# Patient Record
Sex: MALE | Employment: OTHER | URBAN - NONMETROPOLITAN AREA
[De-identification: names, ages, dates, MRNs, and addresses within clinical notes are randomized per-mention and may not be internally consistent; named-entity substitution may affect disease eponyms.]

---

## 2021-05-18 ENCOUNTER — LAB ENCOUNTER (OUTPATIENT)
Dept: LAB | Age: 61
End: 2021-05-18
Attending: FAMILY MEDICINE
Payer: COMMERCIAL

## 2021-05-18 DIAGNOSIS — I10 ESSENTIAL HYPERTENSION: ICD-10-CM

## 2021-05-18 DIAGNOSIS — K70.31 ASCITES DUE TO ALCOHOLIC CIRRHOSIS (HCC): ICD-10-CM

## 2021-05-18 DIAGNOSIS — R73.01 IFG (IMPAIRED FASTING GLUCOSE): ICD-10-CM
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
## 2021-05-19 ENCOUNTER — TELEPHONE (OUTPATIENT)
Dept: FAMILY MEDICINE CLINIC | Facility: CLINIC | Age: 61
End: 2021-05-19

## 2021-05-19 NOTE — TELEPHONE ENCOUNTER
Received records from [address] [place] [address] in [address] [place] [address]. Entered some labs into epic.

## 2021-05-21 ENCOUNTER — MED REC SCAN ONLY (OUTPATIENT)
Dept: FAMILY MEDICINE CLINIC | Facility: CLINIC | Age: 61
End: 2021-05-21

## 2021-05-21 ENCOUNTER — TELEPHONE (OUTPATIENT)
Dept: FAMILY MEDICINE CLINIC | Facility: CLINIC | Age: 61
End: 2021-05-21

## 2021-05-21 RX ORDER — LACTULOSE 10 G/15ML
30 SOLUTION ORAL 3 TIMES DAILY PRN
Qty: 4050 ML | Refills: 1 | Status: SHIPPED | OUTPATIENT
Start: 2021-05-21 | End: 2021-07-15

## 2021-05-21 RX ORDER — LACTULOSE 10 G/15ML
30 SOLUTION ORAL 3 TIMES DAILY PRN
Qty: 236 ML | Refills: 0 | Status: SHIPPED | OUTPATIENT
Start: 2021-05-21 | End: 2021-08-20

## 2021-05-21 NOTE — TELEPHONE ENCOUNTER
ON lactulose PRESCRIPTION IT SAYS HIS PLAN WILL ONLY COVER 90 DAY SUPPLY OR MORE, PER PT CANCEL THIS @ [address] TO Florenc[facility] Shabazz

## 2021-05-21 NOTE — TELEPHONE ENCOUNTER
Spoke to patient to confirm how taking Lactulose; he presently is taking once daily; but can take it up to 3 times a day if needed. Problem is he is currently out; will send smaller qty to retail pharmacy; and larger qty to [employer].

## 2021-05-27 ENCOUNTER — TELEPHONE (OUTPATIENT)
Dept: FAMILY MEDICINE CLINIC | Facility: CLINIC | Age: 61
End: 2021-05-27

## 2021-06-02 ENCOUNTER — TELEPHONE (OUTPATIENT)
Dept: FAMILY MEDICINE CLINIC | Facility: CLINIC | Age: 61
End: 2021-06-02

## 2021-06-02 NOTE — TELEPHONE ENCOUNTER
Pop Caldera, RN calling back requesting LOV note, labs, med list     Murlizy Lipa - [phone]  Fax - [phone]    Pt has an upcoming appt on 6/14, but needs to be seen ASAP d/t ascites rapidly re-accumulating.      Pop Caldera will be calling back to let us know

## 2021-06-02 NOTE — PROGRESS NOTES
Mitchell Horta is a 62year old male. Patient presents with:  HTN: fup on labs done at [facility], BP check. ...room 2      HPI:   Patient was hospitalized at [place] [address] early in May.   He was diagnosed with alcoholic cirrhotic liver disease 100 MG Oral Tab, Take 100 mg by mouth daily. , Disp: , Rfl:   mometasone (NASONEX) 50 MCG/ACT Nasal Suspension, 2 Sprays by Nasal route daily. , Disp: 17 g, Rfl: 3    No current facility-administered medications on file prior to visit.        Past Medical His distress  SKIN: no rashes,no suspicious lesions  NECK: supple, no cervical adenopathy  LUNGS: clear to auscultation  CARDIO: RRR without murmur  ABD tense ascites, no tenderness  EXTREMITIES: Bilateral 1+ edema to the tibial plateaus    Orders Only on 05/1 therapy, please refer  to the Heparin Anti-Xa assay (test code 07555). For additional information, please refer to  http://education. [employer]. Nifti/faq/FKD822  (This link is being provided for   informational/educational purposes only.)     • INR elevation of K, Phos and LD  as well as a false decrease in Glucose may occur due to    prolonged contact with red cells.                    Fasting reference interval     For someone without known diabetes, a glucose value  between 100 and 125 mg/dL is con for diagnosis of diabetes in children. According to American Diabetes Association (ADA)  guidelines, hemoglobin A1c <7.0% represents optimal  control in non-pregnant diabetic patients. Different  metrics may apply to specific patient populations.    Sta (hcc)  Essential hypertension  Ifg (impaired fasting glucose)  Hyponatremia    I will put a call out to [facility] gastroenterology to see if we can expedite an appointment . Jaron Joyce He is going to need a repeat paracenteis.   Once we hear from [address] we will

## 2021-06-03 NOTE — TELEPHONE ENCOUNTER
Spoke to Abeba, he states his paracentesis is scheduled until 6/16/21. Pt needs to be treated as soon as possible per Dr. Ford Olsen request.     Left detailed msg for Rudy Tipton RN to assist with coordinating procedure sooner.      Dr. Shanel Mota has been

## 2021-06-03 NOTE — TELEPHONE ENCOUNTER
Spoke to MANOJ Jauregui     She states that patient is supposed to call the hospital today and schedule a paracentesis. He will then follow-up with Dr. Suzi Watts on 6/14/21 to follow-up post procedure.      She was unable to give me date of paracentesis and referr

## 2021-06-03 NOTE — TELEPHONE ENCOUNTER
Spoke to Shelli Araya RN confirmed pt has a scheduled paracentesis on 6/8/21 at 12:15pm. He will need to remain on a low sodium diet. She states they are not able to accommodate him sooner. Per nurse, Hortensia Moncada reported that he is feeling better and denied SOB.

## 2021-06-03 NOTE — TELEPHONE ENCOUNTER
I would defer to the gastroenterologist and if they are recommending he remain on the low-sodium diet, that is what he should do

## 2021-07-15 ENCOUNTER — OFFICE VISIT (OUTPATIENT)
Dept: FAMILY MEDICINE CLINIC | Facility: CLINIC | Age: 61
End: 2021-07-15
Payer: COMMERCIAL

## 2021-07-15 ENCOUNTER — TELEPHONE (OUTPATIENT)
Dept: FAMILY MEDICINE CLINIC | Facility: CLINIC | Age: 61
End: 2021-07-15

## 2021-07-15 VITALS
HEART RATE: 80 BPM | RESPIRATION RATE: 16 BRPM | DIASTOLIC BLOOD PRESSURE: 74 MMHG | TEMPERATURE: 98 F | HEIGHT: 73 IN | SYSTOLIC BLOOD PRESSURE: 138 MMHG | BODY MASS INDEX: 40.21 KG/M2 | OXYGEN SATURATION: 99 % | WEIGHT: 303.38 LBS

## 2021-07-15 DIAGNOSIS — K76.6 PORTAL HYPERTENSION (HCC): Primary | ICD-10-CM

## 2021-07-15 LAB — INR: 1.4 (ref 0.8–1.2)

## 2021-07-15 PROCEDURE — 85610 PROTHROMBIN TIME: CPT | Performed by: INTERNAL MEDICINE

## 2021-07-15 PROCEDURE — 3008F BODY MASS INDEX DOCD: CPT | Performed by: INTERNAL MEDICINE

## 2021-07-15 PROCEDURE — 3078F DIAST BP <80 MM HG: CPT | Performed by: INTERNAL MEDICINE

## 2021-07-15 PROCEDURE — 3075F SYST BP GE 130 - 139MM HG: CPT | Performed by: INTERNAL MEDICINE

## 2021-07-15 PROCEDURE — 99213 OFFICE O/P EST LOW 20 MIN: CPT | Performed by: INTERNAL MEDICINE

## 2021-07-15 NOTE — TELEPHONE ENCOUNTER
His wife called and said that he is going to have a procedure done and that he had a physical within the past couple of months. Was that the pre op?

## 2021-07-15 NOTE — TELEPHONE ENCOUNTER
Spoke to Maria Elena at [facility] in [facility]. Ph. [phone]    She has reached out to Ernesto Puente since Monday advising that he needs an H&P. He was offered an appt with their Nurse Practitioner at their facility but pt declined stating the drive was too far.

## 2021-07-15 NOTE — PROGRESS NOTES
Zach Islas is a 62year old male who presents for a pre-operative physical exam. Patient is to have paracentesis at [facility] on 7/16/2021. HPI:   Patient was hospitalized at [place] [address] early in May.   He was diagnosed with alcoholic 4/19/2013      Past Surgical History:   Procedure Laterality Date   • COLONOSCOPY  10/18/12    Seth, flat cecal polyp, recommend repeat 2015   • OTHER SURGICAL HISTORY      Shoulder surgery   • TONSILLECTOMY     • VASECTOMY        Family History   Problem back  EXTREMITIES: no cyanosis, clubbing or edema  NEURO: Oriented times three,cranial nerves are intact,motor and sensory are grossly intact    ASSESSMENT AND PLAN:   Tiffanie Hamm is a 62year old male who presents for a pre-operative physic

## 2021-07-16 ENCOUNTER — TELEPHONE (OUTPATIENT)
Dept: FAMILY MEDICINE CLINIC | Facility: CLINIC | Age: 61
End: 2021-07-16

## 2021-08-09 ENCOUNTER — MED REC SCAN ONLY (OUTPATIENT)
Dept: FAMILY MEDICINE CLINIC | Facility: CLINIC | Age: 61
End: 2021-08-09

## 2021-08-11 ENCOUNTER — OFFICE VISIT (OUTPATIENT)
Dept: FAMILY MEDICINE CLINIC | Facility: CLINIC | Age: 61
End: 2021-08-11
Payer: COMMERCIAL

## 2021-08-11 VITALS
BODY MASS INDEX: 40.63 KG/M2 | WEIGHT: 300 LBS | DIASTOLIC BLOOD PRESSURE: 64 MMHG | SYSTOLIC BLOOD PRESSURE: 130 MMHG | HEART RATE: 83 BPM | HEIGHT: 72 IN | OXYGEN SATURATION: 98 % | TEMPERATURE: 98 F

## 2021-08-11 DIAGNOSIS — D12.6 TUBULAR ADENOMA OF COLON: ICD-10-CM

## 2021-08-11 DIAGNOSIS — R73.01 IFG (IMPAIRED FASTING GLUCOSE): ICD-10-CM

## 2021-08-11 DIAGNOSIS — K76.6 PORTAL HYPERTENSION (HCC): Primary | ICD-10-CM

## 2021-08-11 DIAGNOSIS — Z85.820 HISTORY OF MELANOMA: ICD-10-CM

## 2021-08-11 DIAGNOSIS — G47.33 OSA ON CPAP: ICD-10-CM

## 2021-08-11 DIAGNOSIS — K70.31 ASCITES DUE TO ALCOHOLIC CIRRHOSIS (HCC): ICD-10-CM

## 2021-08-11 DIAGNOSIS — I10 ESSENTIAL HYPERTENSION: ICD-10-CM

## 2021-08-11 DIAGNOSIS — K63.5 DYSPLASTIC POLYP OF COLON: ICD-10-CM

## 2021-08-11 DIAGNOSIS — Z99.89 OSA ON CPAP: ICD-10-CM

## 2021-08-11 PROCEDURE — 3075F SYST BP GE 130 - 139MM HG: CPT | Performed by: FAMILY MEDICINE

## 2021-08-11 PROCEDURE — 3008F BODY MASS INDEX DOCD: CPT | Performed by: FAMILY MEDICINE

## 2021-08-11 PROCEDURE — 3078F DIAST BP <80 MM HG: CPT | Performed by: FAMILY MEDICINE

## 2021-08-11 PROCEDURE — 99214 OFFICE O/P EST MOD 30 MIN: CPT | Performed by: FAMILY MEDICINE

## 2021-08-11 NOTE — PROGRESS NOTES
Lee Roman is a 62year old male. Patient presents with:  Pre-Op Exam: pre op H&P only for paracentesis on 08/13/21 with Dr. Luis A Ruano at [facility], EGK/Colonoscopy on 08/20/21, and another paracentesi on 08/18/21. ... no labs or ekg needed. visit.        Past Medical History:   Diagnosis Date   • Alcoholic cirrhosis of liver with ascites ([address]     Requiring frequent paracentesis at [facility]   • Allergic rhinitis     getting allergy shots   • Asthma    • Dysplastic polyp of colon     high gr apparent distress  SKIN: no rashes,no suspicious lesions  HEENT: atraumatic, normocephalic, R TM normal, L TM normal, Pharynx normal  NECK: supple, no cervical adenopathy  LUNGS: clear to auscultation  CARDIO: RRR without murmur.   EXTREMITIES: Bilateral 2+

## 2021-08-17 ENCOUNTER — TELEPHONE (OUTPATIENT)
Dept: FAMILY MEDICINE CLINIC | Facility: CLINIC | Age: 61
End: 2021-08-17

## 2021-08-19 DIAGNOSIS — R73.01 IFG (IMPAIRED FASTING GLUCOSE): ICD-10-CM

## 2021-08-19 NOTE — TELEPHONE ENCOUNTER
HE HAS A QUESTION ABOUT THE PRESCRIPTIONS HE IS ON.   HE SAID HE LEFT A LIST WITH DR [facility]  ON  WHAT HE TAKES

## 2021-08-20 RX ORDER — AMLODIPINE BESYLATE 10 MG/1
10 TABLET ORAL DAILY
Qty: 90 TABLET | Refills: 1 | Status: SHIPPED | OUTPATIENT
Start: 2021-08-20

## 2021-08-20 RX ORDER — LACTULOSE 10 G/15ML
30 SOLUTION ORAL 3 TIMES DAILY PRN
Qty: 236 ML | Refills: 0 | Status: SHIPPED | OUTPATIENT
Start: 2021-08-20

## 2021-08-20 RX ORDER — AMLODIPINE BESYLATE 10 MG/1
10 TABLET ORAL DAILY
Qty: 14 TABLET | Refills: 0 | Status: SHIPPED | OUTPATIENT
Start: 2021-08-20 | End: 2021-08-20

## 2021-08-20 NOTE — TELEPHONE ENCOUNTER
Hypertension Medications Protocol Twyten4508/20/2021 08:25 AM   CMP or BMP in past 12 months Protocol Details    Last serum creatinine< 2.0     Appointment in past 6 or next 3 months      Last refill - 5/18/21 - #90   Last CMP - 6/1/21 - creatinine - 0.67  L

## 2021-08-23 ENCOUNTER — MED REC SCAN ONLY (OUTPATIENT)
Dept: FAMILY MEDICINE CLINIC | Facility: CLINIC | Age: 61
End: 2021-08-23

## 2021-08-30 ENCOUNTER — TELEPHONE (OUTPATIENT)
Dept: FAMILY MEDICINE CLINIC | Facility: CLINIC | Age: 61
End: 2021-08-30

## 2021-08-30 NOTE — TELEPHONE ENCOUNTER
Okay to refill everything for now but in the future he should be getting the lactulose from the gastroenterology doctor.   The GI will determine how long to keep him on that

## 2021-08-30 NOTE — TELEPHONE ENCOUNTER
LOV 8/11/21    LAST LAB 6/1/21    LAST RX He has refills available on everything    Next OV No future appointments. PROTOCOL  Asking for larger quantity of lactulose, order indicates he should take up to tid, 135 ml.  States he takes it bid at most, 80

## 2021-08-30 NOTE — TELEPHONE ENCOUNTER
REFILL lactulose, Pantoprazole, FOLIC ACID, amLODIPine, furosemide, & spironolactone, 90 DAY SU[facility]PL[facility] ON [facility]

## 2021-09-14 ENCOUNTER — TELEPHONE (OUTPATIENT)
Dept: FAMILY MEDICINE CLINIC | Facility: CLINIC | Age: 61
End: 2021-09-14

## 2021-09-14 NOTE — TELEPHONE ENCOUNTER
Spoke with wife, she would like to  immunization records to send to another physician.  Notified wife that patient would have to come in and sign a medical release form and we could print off vaccination record

## 2021-09-20 ENCOUNTER — MED REC SCAN ONLY (OUTPATIENT)
Dept: FAMILY MEDICINE CLINIC | Facility: CLINIC | Age: 61
End: 2021-09-20

## 2021-09-21 ENCOUNTER — TELEPHONE (OUTPATIENT)
Dept: FAMILY MEDICINE CLINIC | Facility: CLINIC | Age: 61
End: 2021-09-21

## 2021-09-21 NOTE — TELEPHONE ENCOUNTER
PT FILLED OUT MED REC REQ 9/21/2021. SENT TO SCAN STAT 9/22/21. DOS REQ: 2004-PRESENT.   PLEASE SEND RECORDS TO [address] [employer] [address]

## 2021-09-24 ENCOUNTER — MED REC SCAN ONLY (OUTPATIENT)
Dept: FAMILY MEDICINE CLINIC | Facility: CLINIC | Age: 61
End: 2021-09-24

## 2021-10-05 ENCOUNTER — TELEPHONE (OUTPATIENT)
Dept: FAMILY MEDICINE CLINIC | Facility: CLINIC | Age: 61
End: 2021-10-05

## 2021-10-27 ENCOUNTER — TELEPHONE (OUTPATIENT)
Dept: FAMILY MEDICINE CLINIC | Facility: CLINIC | Age: 61
End: 2021-10-27

## 2021-10-27 NOTE — TELEPHONE ENCOUNTER
Julian Gonzales is [facility] is saying that [facility] never received the sleep study paperwork please fax to [phone] Attn: Zainab Padilla PRINCIPAL DISCHARGE DIAGNOSIS  Diagnosis: Cellulitis  Assessment and Plan of Treatment:       SECONDARY DISCHARGE DIAGNOSES  Diagnosis: Sepsis  Assessment and Plan of Treatment:

## 2021-12-29 ENCOUNTER — TELEPHONE (OUTPATIENT)
Dept: FAMILY MEDICINE CLINIC | Facility: CLINIC | Age: 61
End: 2021-12-29

## 2023-02-22 ENCOUNTER — PATIENT OUTREACH (OUTPATIENT)
Dept: FAMILY MEDICINE CLINIC | Facility: CLINIC | Age: 63
End: 2023-02-22

## 2023-07-31 ENCOUNTER — PATIENT OUTREACH (OUTPATIENT)
Dept: FAMILY MEDICINE CLINIC | Facility: CLINIC | Age: 63
End: 2023-07-31

## 2023-08-01 ENCOUNTER — PATIENT OUTREACH (OUTPATIENT)
Dept: CASE MANAGEMENT | Age: 63
End: 2023-08-01

## 2023-08-01 NOTE — PROCEDURES
The office order for PCP removal request is Approved and finalized on August 1, 2023.     Thanks,  [facility] [employer]